# Patient Record
Sex: FEMALE | Employment: FULL TIME | URBAN - METROPOLITAN AREA
[De-identification: names, ages, dates, MRNs, and addresses within clinical notes are randomized per-mention and may not be internally consistent; named-entity substitution may affect disease eponyms.]

---

## 2023-10-11 DIAGNOSIS — Z30.41 ORAL CONTRACEPTIVE PILL SURVEILLANCE: Primary | ICD-10-CM

## 2023-10-12 RX ORDER — NORETHINDRONE 0.35 MG/1
1 TABLET ORAL DAILY
Qty: 28 TABLET | Refills: 6 | Status: SHIPPED | OUTPATIENT
Start: 2023-10-12 | End: 2024-04-23 | Stop reason: SDUPTHER

## 2024-04-21 DIAGNOSIS — Z30.41 ORAL CONTRACEPTIVE PILL SURVEILLANCE: ICD-10-CM

## 2024-04-22 RX ORDER — NORETHINDRONE 0.35 MG/1
1 TABLET ORAL DAILY
Qty: 28 TABLET | Refills: 6 | OUTPATIENT
Start: 2024-04-22

## 2024-04-23 DIAGNOSIS — Z30.41 ORAL CONTRACEPTIVE PILL SURVEILLANCE: ICD-10-CM

## 2024-04-23 RX ORDER — NORETHINDRONE 0.35 MG/1
1 TABLET ORAL DAILY
Qty: 28 TABLET | Refills: 6 | Status: SHIPPED | OUTPATIENT
Start: 2024-04-23

## 2024-08-08 ENCOUNTER — APPOINTMENT (OUTPATIENT)
Dept: OBSTETRICS AND GYNECOLOGY | Facility: CLINIC | Age: 29
End: 2024-08-08
Payer: COMMERCIAL

## 2025-05-01 ENCOUNTER — OFFICE VISIT (OUTPATIENT)
Dept: OBSTETRICS AND GYNECOLOGY | Facility: CLINIC | Age: 30
End: 2025-05-01
Payer: COMMERCIAL

## 2025-05-01 ENCOUNTER — TELEPHONE (OUTPATIENT)
Dept: OBSTETRICS AND GYNECOLOGY | Facility: CLINIC | Age: 30
End: 2025-05-01

## 2025-05-01 VITALS
HEART RATE: 99 BPM | SYSTOLIC BLOOD PRESSURE: 109 MMHG | BODY MASS INDEX: 37.13 KG/M2 | DIASTOLIC BLOOD PRESSURE: 77 MMHG | WEIGHT: 201.8 LBS | HEIGHT: 62 IN

## 2025-05-01 DIAGNOSIS — N60.91: ICD-10-CM

## 2025-05-01 DIAGNOSIS — N92.6 IRREGULAR MENSES: ICD-10-CM

## 2025-05-01 DIAGNOSIS — N60.92: ICD-10-CM

## 2025-05-01 DIAGNOSIS — N64.52 NIPPLE DISCHARGE: Primary | ICD-10-CM

## 2025-05-01 DIAGNOSIS — L70.8 OTHER ACNE: ICD-10-CM

## 2025-05-01 LAB
17OHP SERPL-MCNC: NORMAL NG/DL
B-HCG SERPL-ACNC: <5 MIU/ML
DHEA-S SERPL-MCNC: 241 MCG/DL (ref 14–349)
ESTRADIOL SERPL-MCNC: 89 PG/ML
FSH SERPL-ACNC: 4.5 MIU/ML
LH SERPL-ACNC: 5.1 MIU/ML
PROGEST SERPL-MCNC: 6.2 NG/ML
TESTOST FREE SERPL-MCNC: NORMAL PG/ML
TESTOST SERPL-MCNC: NORMAL NG/DL

## 2025-05-01 PROCEDURE — 3008F BODY MASS INDEX DOCD: CPT | Performed by: OBSTETRICS & GYNECOLOGY

## 2025-05-01 PROCEDURE — 99213 OFFICE O/P EST LOW 20 MIN: CPT | Performed by: OBSTETRICS & GYNECOLOGY

## 2025-05-01 PROCEDURE — 1036F TOBACCO NON-USER: CPT | Performed by: OBSTETRICS & GYNECOLOGY

## 2025-05-01 RX ORDER — TOPIRAMATE 25 MG/1
50 TABLET ORAL DAILY
COMMUNITY
Start: 2025-04-15

## 2025-05-01 RX ORDER — SUMATRIPTAN SUCCINATE 50 MG/1
50 TABLET ORAL ONCE AS NEEDED
COMMUNITY
Start: 2025-04-28

## 2025-05-01 RX ORDER — MECLIZINE HCL 12.5 MG 12.5 MG/1
12.5 TABLET ORAL EVERY 8 HOURS PRN
COMMUNITY
Start: 2025-04-01

## 2025-05-01 ASSESSMENT — LIFESTYLE VARIABLES
HOW MANY STANDARD DRINKS CONTAINING ALCOHOL DO YOU HAVE ON A TYPICAL DAY: PATIENT DOES NOT DRINK
SKIP TO QUESTIONS 9-10: 1
AUDIT-C TOTAL SCORE: 0
HOW OFTEN DO YOU HAVE SIX OR MORE DRINKS ON ONE OCCASION: NEVER
HOW OFTEN DO YOU HAVE A DRINK CONTAINING ALCOHOL: NEVER

## 2025-05-01 ASSESSMENT — PATIENT HEALTH QUESTIONNAIRE - PHQ9
2. FEELING DOWN, DEPRESSED OR HOPELESS: NOT AT ALL
1. LITTLE INTEREST OR PLEASURE IN DOING THINGS: NOT AT ALL
SUM OF ALL RESPONSES TO PHQ9 QUESTIONS 1 & 2: 0

## 2025-05-01 ASSESSMENT — ENCOUNTER SYMPTOMS
LOSS OF SENSATION IN FEET: 0
OCCASIONAL FEELINGS OF UNSTEADINESS: 0
DEPRESSION: 0

## 2025-05-01 ASSESSMENT — PAIN SCALES - GENERAL: PAINLEVEL_OUTOF10: 1

## 2025-05-01 NOTE — PROGRESS NOTES
Subjective   Patient ID: Vlad Edouard is a 29 y.o. female who presents for Breast Problem (Discharge bilateral ).  Periods q3-7days,  Cramping 2 weeks prior to menses  Partner had vas, had recheck 3mo after and was neg  Stopped ocps 9/2024  Recently w/some increased migraines no aura, nausea, brain fog  Tested w/PCP and all wnl  Gets pain w/palpation  +sexually active but has been having  avoid touching breast  Spontaneous dc   baby x1yr, stopped in 9/2024  Does report increased stress lately with her family, brings her to tears at times      Breast Problem  Chronicity:  New  Associated Symptoms: breast pain and breast discharge    Affected side:  Both  Progression since onset:  Waxing and waning  Currently pregnant:  No  Current birth control:  Vasectomy      Review of Systems   Constitutional:  Negative for chills, diaphoresis, fatigue and fever.   Genitourinary:  Positive for menstrual problem. Negative for vaginal bleeding, vaginal discharge and vaginal pain.       Objective   Physical Exam  Constitutional:       Appearance: Normal appearance.   HENT:      Head: Normocephalic and atraumatic.   Chest:   Breasts:     Right: Normal. No swelling, bleeding, inverted nipple, mass, nipple discharge, skin change or tenderness.      Left: Normal. No swelling, bleeding, inverted nipple, mass, nipple discharge, skin change or tenderness.   Lymphadenopathy:      Upper Body:      Right upper body: No axillary adenopathy.      Left upper body: No axillary adenopathy.   Skin:     General: Skin is warm and dry.   Neurological:      General: No focal deficit present.      Mental Status: She is alert.   Psychiatric:         Attention and Perception: Attention and perception normal.         Mood and Affect: Mood and affect normal.         Speech: Speech normal.         Behavior: Behavior is cooperative.         Assessment/Plan   Problem List Items Addressed This Visit    None  Visit Diagnoses          Codes      Nipple discharge    -  Primary N64.52    Relevant Orders    [facility] HCG, TOTAL, QN (Completed)    Referral to Breast Surgery      Irregular menses     N92.6    Relevant Orders    [facility] HCG, TOTAL, QN (Completed)    FSH & LH (Completed)    Progesterone (Completed)    Estradiol (Completed)    17-Hydroxyprogesterone (Completed)    Testosterone,Free and Total (Completed)    DHEA-Sulfate (Completed)      Other acne     L70.8    Relevant Orders    17-Hydroxyprogesterone (Completed)    Testosterone,Free and Total (Completed)    DHEA-Sulfate (Completed)      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

## 2025-05-01 NOTE — TELEPHONE ENCOUNTER
Est pt calling with c/o bilateral nipple discharge x2 mos. Pt's pcp ordered breast imaging. See results in Epic; pulled from [facility]. SDA for 10:15a today.

## 2025-05-04 ASSESSMENT — ENCOUNTER SYMPTOMS
FEVER: 0
DIAPHORESIS: 0
BREAST PAIN: 1
FATIGUE: 0
CHILLS: 0

## 2025-05-05 LAB
17OHP SERPL-MCNC: NORMAL NG/DL
B-HCG SERPL-ACNC: <5 MIU/ML
DHEA-S SERPL-MCNC: 241 MCG/DL (ref 14–349)
ESTRADIOL SERPL-MCNC: 89 PG/ML
FSH SERPL-ACNC: 4.5 MIU/ML
LH SERPL-ACNC: 5.1 MIU/ML
PROGEST SERPL-MCNC: 6.2 NG/ML
TESTOST FREE SERPL-MCNC: 2.8 PG/ML (ref 0.1–6.4)
TESTOST SERPL-MCNC: 16 NG/DL (ref 2–45)

## 2025-05-12 LAB
17OHP SERPL-MCNC: 120 NG/DL
B-HCG SERPL-ACNC: <5 MIU/ML
DHEA-S SERPL-MCNC: 241 MCG/DL (ref 14–349)
ESTRADIOL SERPL-MCNC: 89 PG/ML
FSH SERPL-ACNC: 4.5 MIU/ML
LH SERPL-ACNC: 5.1 MIU/ML
PROGEST SERPL-MCNC: 6.2 NG/ML
TESTOST FREE SERPL-MCNC: 2.8 PG/ML (ref 0.1–6.4)
TESTOST SERPL-MCNC: 16 NG/DL (ref 2–45)

## 2025-05-20 ENCOUNTER — HOSPITAL ENCOUNTER (OUTPATIENT)
Dept: RADIOLOGY | Facility: EXTERNAL LOCATION | Age: 30
Discharge: HOME | End: 2025-05-20

## 2025-05-28 NOTE — PROGRESS NOTES
Vlad Edouard female   1995 29 y.o.   [id number]      Chief Complaint  Bilateral nipple discharge    History Of Present Illness  Vlad Edouard is a very pleasant 29 y.o. female [facility] by Liz Larsen for bilateral nipple discharge. She first noticed the discharge in 2025. She states it was spontaneous and milky in color. She states it has since lessened. She denies trauma to the breast. She has no family history of breast cancer. She denies breast surgery or biopsy. She recently had her hormone levels drawn (Estradiol, Progesterone, FSH & LH, Testosterone and Prolactin), all in normal range. She also reports recent migraines with brain fog, worked up by MRI with normal results.     BREAST IMAGIN2025 [facility] Bilateral breast ultrasound, indicates BI-RADS Category 2.    REPRODUCTIVE HISTORY: menarche age 10, , first birth age 21,  x 15 months, OCP's x 7 years, premenopausal, LMP 2025,  had vasectomy     FAMILY CANCER HISTORY:   Maternal Aunt: GYN cancer  Maternal Great Grandmother: Lung cancer, smoker  Paternal Great Grandfather: Colon cancer     Surgical History  She has no past surgical history on file.     Social History  She reports that she has never smoked. She has never used smokeless tobacco. She reports that she does not currently use alcohol. She reports that she does not use drugs.    Family History  Family History[1]     Allergies  Patient has no known allergies.    Medications  Current Outpatient Medications   Medication Instructions    cyclobenzaprine (FLEXERIL) 10 mg, 3 times daily    meclizine (ANTIVERT) 12.5 mg, Every 8 hours PRN    methocarbamol (ROBAXIN) 500 mg, Every 8 hours PRN    POTASSIUM CITRATE ORAL 99 mg, 2 times daily    SUMAtriptan (IMITREX) 50 mg, Once as needed    topiramate (TOPAMAX) 50 mg, Daily         REVIEW OF SYSTEMS    Constitutional:  Negative for appetite change, fatigue, fever and unexpected weight  change.   HENT:  Negative for ear pain, hearing loss, nosebleeds, sore throat and trouble swallowing.    Eyes:  Negative for discharge, itching and visual disturbance.   Respiratory:  Negative for cough, chest tightness and shortness of breath.    Cardiovascular:  Negative for chest pain, palpitations and leg swelling.   Breast: as indicated in HPI  Gastrointestinal:  Negative for abdominal pain, constipation, diarrhea and nausea.   Endocrine: Negative for cold intolerance and heat intolerance.   Genitourinary:  Negative for dysuria, frequency, hematuria, pelvic pain and vaginal bleeding.   Musculoskeletal:  Negative for arthralgias, back pain, gait problem, joint swelling and myalgias.   Skin:  Negative for color change and rash.   Allergic/Immunologic: Negative for environmental allergies and food allergies.   Neurological:  Negative for dizziness, tremors, speech difficulty, weakness, numbness and headaches.   Hematological:  Does not bruise/bleed easily.   Psychiatric/Behavioral:  Negative for agitation, dysphoric mood and sleep disturbance. The patient is not nervous/anxious.         Past Medical History  She has a past medical history of Migraine (03/17/2025).     Physical Exam  Patient is alert and oriented x3 and in a relaxed and appropriate mood. Her gait is steady and hand grasps are equal. Sclera is clear. The breasts are nearly symmetrical. The tissue is soft without palpable abnormalities, discrete nodules or masses. The skin and nipples appear normal. There is no evidence of nipple discharge upon palpation. There is no cervical, supraclavicular or axillary lymphadenopathy. Heart rate and rhythm normal, S1 and S2 appreciated. The lungs are clear to auscultation bilaterally.     Physical Exam     Last Recorded Vitals  Vitals:    06/12/25 1117   BP: 110/75   Pulse: 85   Temp: 36.3 °C (97.3 °F)   SpO2: 100%       Relevant Results   Time was spent discussing digital images of the radiology testing with the  patient. I explained the results in depth, along with suggested explanation for follow up recommendations based on the testing results. BI-RADS Category 2        Assessment/Plan   Normal clinical exam and imaging ([facility]), bilateral nipple discharge, no family history of breast cancer    Plan: Return to PCP for annual breast exams. Reassured normal exam and imaging.     Patient Discussion/Summary  Your clinical examination and imaging are normal. You no longer need to be seen by a breast specialist for an annual physical breast examination. It is important to continue annual breast exams through your primary care provider. Please return to see me if you have a new breast problem or abnormal mammogram. It has been a pleasure having you as a patient.     You can see your health information, review clinical summaries from office visits & test results online when you follow your health with MY  Chart, a personal health record. To sign up go to [email or web address]. If you need assistance with signing up or trouble getting into your account call [employer] Patient Line 24/7 at [phone].    My office phone number is [phone] if you need to get in touch with me or have additional questions or concerns. Thank you for choosing [facility] and trusting me as your healthcare provider. I am honored to be a provider on your health care team and I remain dedicated to helping you achieve your health goals.       Em Pond, ASAEL-CNP         [1]   Family History  Problem Relation Name Age of Onset    Arthritis Mother Jeri     Hypertension Mother Jeri     Miscarriages / Stillbirths Mother Jeri     Alcohol abuse Father Annalee     Drug abuse Father Annalee     Hypertension Father Annalee     Cancer Maternal Grandmother Ruth freeman     Colon cancer Paternal Grandfather Clemente     Cancer Paternal Grandmother Jeri     Mental illness Mother's Sister Maurilio     Intellectual Disability Son Arian

## 2025-06-12 ENCOUNTER — OFFICE VISIT (OUTPATIENT)
Dept: SURGICAL ONCOLOGY | Facility: CLINIC | Age: 30
End: 2025-06-12
Payer: COMMERCIAL

## 2025-06-12 VITALS
DIASTOLIC BLOOD PRESSURE: 75 MMHG | HEART RATE: 85 BPM | SYSTOLIC BLOOD PRESSURE: 110 MMHG | TEMPERATURE: 97.3 F | WEIGHT: 197 LBS | BODY MASS INDEX: 36.03 KG/M2 | OXYGEN SATURATION: 100 %

## 2025-06-12 DIAGNOSIS — N64.52 NIPPLE DISCHARGE: ICD-10-CM

## 2025-06-12 DIAGNOSIS — N60.92: ICD-10-CM

## 2025-06-12 DIAGNOSIS — N60.91: ICD-10-CM

## 2025-06-12 PROCEDURE — 1036F TOBACCO NON-USER: CPT | Performed by: NURSE PRACTITIONER

## 2025-06-12 PROCEDURE — 99213 OFFICE O/P EST LOW 20 MIN: CPT | Performed by: NURSE PRACTITIONER

## 2025-06-12 PROCEDURE — 99203 OFFICE O/P NEW LOW 30 MIN: CPT | Performed by: NURSE PRACTITIONER

## 2025-06-12 RX ORDER — METHOCARBAMOL 500 MG/1
500 TABLET, FILM COATED ORAL EVERY 8 HOURS PRN
COMMUNITY
Start: 2025-05-12

## 2025-06-12 RX ORDER — CYCLOBENZAPRINE HCL 10 MG
10 TABLET ORAL 3 TIMES DAILY
COMMUNITY
Start: 2025-06-09 | End: 2025-06-14

## 2025-06-12 ASSESSMENT — PATIENT HEALTH QUESTIONNAIRE - PHQ9
SUM OF ALL RESPONSES TO PHQ9 QUESTIONS 1 & 2: 0
2. FEELING DOWN, DEPRESSED OR HOPELESS: NOT AT ALL
1. LITTLE INTEREST OR PLEASURE IN DOING THINGS: NOT AT ALL

## 2025-06-12 ASSESSMENT — PAIN SCALES - GENERAL: PAINLEVEL_OUTOF10: 0-NO PAIN

## 2025-06-12 NOTE — PATIENT INSTRUCTIONS
Your clinical examination and imaging are normal. You no longer need to be seen by a breast specialist for an annual physical breast examination. It is important to continue annual breast exams through your primary care provider. Please return to see me if you have a new breast problem or abnormal mammogram. It has been a pleasure having you as a patient.     You can see your health information, review clinical summaries from office visits & test results online when you follow your health with MY  Chart, a personal health record. To sign up go to [email or web address]. If you need assistance with signing up or trouble getting into your account call [employer] Patient Line 24/7 at [phone].    My office phone number is [phone] if you need to get in touch with me or have additional questions or concerns. Thank you for choosing [facility] and trusting me as your healthcare provider. I am honored to be a provider on your health care team and I remain dedicated to helping you achieve your health goals.

## 2025-06-26 ENCOUNTER — APPOINTMENT (OUTPATIENT)
Dept: OBSTETRICS AND GYNECOLOGY | Facility: CLINIC | Age: 30
End: 2025-06-26
Payer: COMMERCIAL

## 2025-07-29 ENCOUNTER — APPOINTMENT (OUTPATIENT)
Dept: PRIMARY CARE | Facility: CLINIC | Age: 30
End: 2025-07-29
Payer: COMMERCIAL

## 2025-07-29 VITALS
HEIGHT: 61 IN | DIASTOLIC BLOOD PRESSURE: 76 MMHG | BODY MASS INDEX: 36.1 KG/M2 | WEIGHT: 191.2 LBS | TEMPERATURE: 98.2 F | OXYGEN SATURATION: 100 % | SYSTOLIC BLOOD PRESSURE: 110 MMHG | HEART RATE: 75 BPM

## 2025-07-29 DIAGNOSIS — I73.00 RAYNAUD'S PHENOMENON WITHOUT GANGRENE: ICD-10-CM

## 2025-07-29 DIAGNOSIS — G56.03 BILATERAL CARPAL TUNNEL SYNDROME: ICD-10-CM

## 2025-07-29 DIAGNOSIS — R73.09 ABNORMAL BLOOD SUGAR: ICD-10-CM

## 2025-07-29 DIAGNOSIS — F41.9 CHRONIC ANXIETY: ICD-10-CM

## 2025-07-29 DIAGNOSIS — G43.701 CHRONIC MIGRAINE W/O AURA, NOT INTRACTABLE, W STAT MIGR: Primary | ICD-10-CM

## 2025-07-29 PROCEDURE — 1036F TOBACCO NON-USER: CPT | Performed by: INTERNAL MEDICINE

## 2025-07-29 PROCEDURE — 99395 PREV VISIT EST AGE 18-39: CPT | Performed by: INTERNAL MEDICINE

## 2025-07-29 PROCEDURE — 3008F BODY MASS INDEX DOCD: CPT | Performed by: INTERNAL MEDICINE

## 2025-07-29 PROCEDURE — 99214 OFFICE O/P EST MOD 30 MIN: CPT | Performed by: INTERNAL MEDICINE

## 2025-07-29 RX ORDER — TOPIRAMATE 100 MG/1
100 TABLET, FILM COATED ORAL NIGHTLY
COMMUNITY
Start: 2025-05-27

## 2025-07-29 RX ORDER — ALBUTEROL SULFATE 90 UG/1
INHALANT RESPIRATORY (INHALATION)
COMMUNITY
Start: 2025-01-22

## 2025-07-29 RX ORDER — LEG BRACE
EACH MISCELLANEOUS
Qty: 2 EACH | Refills: 0 | Status: SHIPPED | OUTPATIENT
Start: 2025-07-29

## 2025-07-29 ASSESSMENT — ENCOUNTER SYMPTOMS
DIZZINESS: 1
SINUS PAIN: 0
TINGLING: 1
FEVER: 0
BLOOD IN STOOL: 0
DIARRHEA: 0
SORE THROAT: 0
FATIGUE: 1
UNEXPECTED WEIGHT CHANGE: 0
ARTHRALGIAS: 0
BRUISES/BLEEDS EASILY: 0
HEADACHES: 0
ABDOMINAL PAIN: 0
DIFFICULTY URINATING: 0
PALPITATIONS: 0
WHEEZING: 0
COUGH: 0

## 2025-07-29 ASSESSMENT — ANXIETY QUESTIONNAIRES
5. BEING SO RESTLESS THAT IT IS HARD TO SIT STILL: NOT AT ALL
4. TROUBLE RELAXING: NOT AT ALL
1. FEELING NERVOUS, ANXIOUS, OR ON EDGE: NOT AT ALL
6. BECOMING EASILY ANNOYED OR IRRITABLE: NOT AT ALL
3. WORRYING TOO MUCH ABOUT DIFFERENT THINGS: NOT AT ALL
2. NOT BEING ABLE TO STOP OR CONTROL WORRYING: NOT AT ALL
GAD7 TOTAL SCORE: 0
7. FEELING AFRAID AS IF SOMETHING AWFUL MIGHT HAPPEN: NOT AT ALL

## 2025-07-29 ASSESSMENT — PATIENT HEALTH QUESTIONNAIRE - PHQ9
2. FEELING DOWN, DEPRESSED OR HOPELESS: NOT AT ALL
1. LITTLE INTEREST OR PLEASURE IN DOING THINGS: NOT AT ALL
SUM OF ALL RESPONSES TO PHQ9 QUESTIONS 1 AND 2: 0

## 2025-07-29 NOTE — PROGRESS NOTES
"Make Subjective   Patient ID: Vlad Edouard \"Shade" is a 29 y.o. female who presents for New Patient Visit, Annual Exam, migraines (Needs new neuro), Photophobia, Nausea, droop (Eyes droop at times, co workers notice, seems to happen when having migraines), Fatigue, Tingling (Tingling hands and feet), Wrist Pain (Did have testing), Hand Injury (Hands turn colors mostly when handles cold things , feet are now turning), patches (Red patches on thighs), and Dizziness.    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  Recent labs reviewed  Borderline high blood sugar hemoglobin A1c 5.7  Family history noncontributory    Annual preventive visit  -Vaccination up-to-date  -Obesity counseled about weight loss  -Pregnancy x 1  -Recent blood work reviewed and up-to-date      Follow-up  - History of galactorrhea seen by breast surgery workup negative including MRI/MRA of the brain  -Chronic migraines controlled continues topiramate symptoms are controlled  - Bilateral hand abnormality underlying Raynaud's phenomena patient reassured no recurrence counseled about prevention conservative measures  - Bilateral hand pain underlying mild carpal tunnel patient counseled about bilateral wrist support follow-up if no improvement recently seen by neurology no significant findings  -High fasting blood sugar hemoglobin A1c 5.7 repeat blood sugar in 3 months and hemoglobin A1c follow-up results closely  -Chronic anxiety patient counseled about conservative measures consider starting medication patient declined at this time  Reevaluate patient in 3 months    Fatigue  Associated symptoms include fatigue. Pertinent negatives include no abdominal pain, arthralgias, chest pain, congestion, coughing, fever, headaches, rash or sore throat. " "  Tingling  Associated symptoms include fatigue. Pertinent negatives include no abdominal pain, arthralgias, chest pain, congestion, coughing, fever, headaches, rash or sore throat.   Wrist Pain   Associated symptoms include tingling. Pertinent negatives include no fever.   Hand Injury   Associated symptoms include tingling. Pertinent negatives include no chest pain.   Dizziness  Associated symptoms include fatigue. Pertinent negatives include no abdominal pain, arthralgias, chest pain, congestion, coughing, fever, headaches, rash or sore throat.          Review of Systems   Constitutional:  Positive for fatigue. Negative for fever and unexpected weight change.   HENT:  Negative for congestion, ear discharge, ear pain, mouth sores, sinus pain and sore throat.    Eyes:  Negative for visual disturbance.   Respiratory:  Negative for cough and wheezing.    Cardiovascular:  Negative for chest pain, palpitations and leg swelling.   Gastrointestinal:  Negative for abdominal pain, blood in stool and diarrhea.   Genitourinary:  Negative for difficulty urinating.   Musculoskeletal:  Negative for arthralgias.   Skin:  Negative for rash.   Neurological:  Positive for dizziness and tingling. Negative for headaches.   Hematological:  Does not bruise/bleed easily.   Psychiatric/Behavioral:  Negative for behavioral problems.    All other systems reviewed and are negative.      Objective   Lab Results   Component Value Date    HGBA1C 5.7 (H) 04/01/2025      /76   Pulse 75   Temp 36.8 °C (98.2 °F)   Ht (!) 1.549 m (5' 1\")   Wt 86.7 kg (191 lb 3.2 oz)   SpO2 100%   BMI 36.13 kg/m²     Physical Exam  Vitals and nursing note reviewed.   Constitutional:       Appearance: Normal appearance.   HENT:      Head: Normocephalic.      Nose: Nose normal.     Eyes:      Conjunctiva/sclera: Conjunctivae normal.      Pupils: Pupils are equal, round, and reactive to light.       Cardiovascular:      Rate and Rhythm: Regular rhythm. " "  Pulmonary:      Effort: Pulmonary effort is normal.      Breath sounds: Normal breath sounds.   Abdominal:      General: Abdomen is flat.      Palpations: Abdomen is soft.     Musculoskeletal:      Cervical back: Neck supple.     Skin:     General: Skin is warm.     Neurological:      General: No focal deficit present.      Mental Status: She is oriented to person, place, and time.     Psychiatric:         Mood and Affect: Mood normal.       Lab Results   Component Value Date    WBC 8.3 09/29/2022    HGB 11.5 (L) 09/29/2022    HCT 35.7 (L) 09/29/2022     09/29/2022    HGBA1C 5.7 (H) 04/01/2025     par   Assessment/Plan   Nayshaness \"Naysha\" was seen today for new patient visit, annual exam, migraines, photophobia, nausea, droop, fatigue, tingling, wrist pain, hand injury, patches and dizziness.  Diagnoses and all orders for this visit:  Chronic migraine w/o aura, not intractable, w stat migr (Primary)  -     Albumin-Creatinine Ratio, Urine Random; Future  -     Albumin-Creatinine Ratio, Urine Random  Raynaud's phenomenon without gangrene  Chronic anxiety  Bilateral carpal tunnel syndrome  -     arm brace (Wrist Support One Size) misc; Bilateral wrist support for carpal tunnel syndrome  Abnormal blood sugar  -     Hemoglobin A1C; Future  -     Hemoglobin A1C  Other orders  -     Follow Up In Primary Care - Established; Future   All the records from [facility] neurology primary care reviewed  Past medical history chronic migraines fatigue  Denies any smoking denies any alcohol  Social history denies any smoking or alcohol 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  Recent labs reviewed  Borderline high blood sugar hemoglobin A1c 5.7  Family history noncontributory    Annual preventive visit  -Vaccination up-to-date  -Obesity counseled about weight loss  -Pregnancy x 1  -Recent blood work reviewed and up-to-date      Follow-up  - History of galactorrhea seen by breast surgery workup negative including " MRI/MRA of the brain  -Chronic migraines controlled continues topiramate symptoms are controlled  - Bilateral hand abnormality underlying Raynaud's phenomena patient reassured no recurrence counseled about prevention conservative measures  - Bilateral hand pain underlying mild carpal tunnel patient counseled about bilateral wrist support follow-up if no improvement recently seen by neurology no significant findings  -High fasting blood sugar hemoglobin A1c 5.7 repeat blood sugar in 3 months and hemoglobin A1c follow-up results closely  -Chronic anxiety patient counseled about conservative measures consider starting medication patient declined at this time  Reevaluate patient in 3 months

## 2025-07-31 ENCOUNTER — APPOINTMENT (OUTPATIENT)
Dept: OBSTETRICS AND GYNECOLOGY | Facility: CLINIC | Age: 30
End: 2025-07-31
Payer: COMMERCIAL

## 2025-09-11 ENCOUNTER — APPOINTMENT (OUTPATIENT)
Dept: OBSTETRICS AND GYNECOLOGY | Facility: CLINIC | Age: 30
End: 2025-09-11
Payer: COMMERCIAL

## 2025-10-30 ENCOUNTER — APPOINTMENT (OUTPATIENT)
Dept: PRIMARY CARE | Facility: CLINIC | Age: 30
End: 2025-10-30
Payer: COMMERCIAL